# Patient Record
(demographics unavailable — no encounter records)

---

## 2025-02-24 NOTE — HISTORY OF PRESENT ILLNESS
[FreeTextEntry1] : Carlton Wolfe Sep-30-'81  41 y/o   *HTN-diagnosed in 20-30s, no family hx HTN *DANIELA-severe *Gout, partial thyroidectomy '13,'14 not on supplementation  here for followup  Last visit Dec '23 reported headaches on amlodiine 5 mg daily discussed splitting to 2.5 BID abnormal sleep study reviewed later via telehealth Jan '24 2 month followup advised  Since then no problems BPs 123/81 at urgent care checks 1-2 tiems a month 120s/80s didn't followup w/ sleep med advised him to followup. added more cardio into workouts plays ice hockey.  reviewed meds: -amlodipine 5 mg daily taking once a day & on longer w/ headaches -Valsartan-HCTZ 320-25 daily  labs reviewed - K 4.1 Cr4 1.04   Glc 101   HDL 37   ASCVD 10 yr 2.7% - emphasize lifestyle no meds - reviewd this w/ pt.  atypical chest discomfort secs not consistently w/ cardio reassured it is musculoskeletal.  sees PCP who orders carotid US yearly & Echo every 2-3 yrs last one June '24  manual -135/80s on both arms.  reports recent lung infections pna bronchitis 2x a year over last 2 yrs. ================================== TESTING:  *ECG Jun '23: NSR rsR' no ischemic changes. *Echo Jul '23" normal EF no LVH MVP w/ mild MR (will repeat eval. in 2 yrs given MVP & mild MR)  Aug '23: Justin - normal, renin pending Renal US Aug '23: no renal artery stenosis  *Dec '23: "severe DANIELA. Decreased sleep efficiency, DANIELA worse supine" "recc: followup w/ polysomnogram & CPAP avoid sleeping in supine position" ==================================

## 2025-03-13 NOTE — IMAGING
[de-identified] : (Exam: Hip)   Laterality is right    Patient is in no acute distress, alert and oriented Sensation is grossly intact to light touch in the foot Motor function is 5/5 in the foot Capillary refill is less than 2 seconds in the toes Lymphadenopathy is not present Peripheral edema is not present   Skin is intact Swelling is not present Atrophy is not present   Trochanteric tenderness is not present Thigh tenderness is present laterally Groin tenderness is not present Lumbar tenderness is not present   Flexion is 120 External rotation is 45 Internal rotation is 20 Abduction is 50   Flexion strength is 4/5 Extension strength is 4/5 Abduction strength is 4/5 Adduction strength is 4/5   MARIO test is normal FADIR test is normal   (Exam: Knee)   Laterality is right    Skin is intact Effusion is not present Atrophy is not present Antalgic gait is present   Medial joint line tenderness is not present Lateral joint line tenderness is not present Patellar tenderness is not present Calf tenderness is not present   Knee extension is 0 Knee flexion is 135   Lachman is normal Posterior drawer is normal Varus stress stability is normal Valgus stress stability is normal   Medial Armando test is normal Lateral Armando test is normal Patellofemoral grind test is normal Patellar apprehension test is normal [Right] : right knee [All Views] : anteroposterior, lateral, skyline, and anteroposterior standing [There are no fractures, subluxations or dislocations. No significant abnormalities are seen] : There are no fractures, subluxations or dislocations. No significant abnormalities are seen

## 2025-03-13 NOTE — DISCUSSION/SUMMARY
[de-identified] : (Impression) -right thigh contusion   The diagnosis was explained in detail. The potential non-surgical and surgical treatments were reviewed. The relative risks and benefits of each option were considered relative to the patient age, activity level, medical history, symptom severity and previously attempted treatments.  The patient was advised to consult with their primary medical provider prior to initiation of any new medications to reduce the risk of adverse effects specific to their long-term home medications and medical history. The risk of gastrointestinal irritation and kidney injury specific to long-term NSAID use was discussed.    (Plan)  -Physical therapy recommended for stretching and strengthening. -Over the counter NSAID for pain and inflammation, take as needed. -MRI is deferred at this time. -Resume activity as tolerated. -Follow up in 4 weeks. If symptoms persist consider MRI.     (MDM) Problem Complexity -Moderate: acute, complicated injury   Risk -Low: over the counter medication   Visit Type -Established

## 2025-03-13 NOTE — HISTORY OF PRESENT ILLNESS
[de-identified] : Date of initial evaluation: 03/13/2025 Patient age: 43 year Body part causing symptoms: right thigh  Location of the pain: lateral Pain score today: 8/10 Duration of symptoms: 03/07/2025  History of injury: got hip checked in right lateral thigh playing hockey. reports pain has been resolving. he notes bruising. he is still playing hockey. reports history of right knee meniscus surgery in 2017 without current symptoms Activities that worsen the pain: walking, prolonged sitting, standing from seated, sleep  Radicular symptoms: none  Medications attempted for this problem: Naproxen  PT for this problem: none  Injections for this problem: none  Previous surgery on this body part: knee scope x2 Prior imaging: none  Occupation: finance for [employer]

## 2025-04-10 NOTE — DATA REVIEWED
[MRI] : MRI [Right] : of the right [Lower Extremities] : lower extremities [I independently reviewed and interpreted images and report] : I independently reviewed and interpreted images and report [FreeTextEntry1] : resolving seroma vs hematoma lateral thigh, low grade strain vastus lateralis, mild PF OA

## 2025-04-10 NOTE — HISTORY OF PRESENT ILLNESS
[de-identified] : 04/10/2025: f/u right thigh, MRI results. Going to acupuncture 1x a week. Symptoms are improving, minimal pain, 2/10. Taking Ibuprofen prn.   Date of initial evaluation: 03/13/2025 Patient age: 43 year Body part causing symptoms: right thigh  Location of the pain: lateral Pain score today: 8/10 Duration of symptoms: 03/07/2025  History of injury: got hip checked in right lateral thigh playing hockey. reports pain has been resolving. he notes bruising. he is still playing hockey. 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

## 2025-04-10 NOTE — DISCUSSION/SUMMARY
[de-identified] : (Impression) -right thigh contusion, vastus lateralis strain  The diagnosis was explained in detail. The potential non-surgical and surgical treatments were reviewed. The relative risks and benefits of each option were considered relative to the patient age, activity level, medical history, symptom severity and previously attempted treatments.  The patient was advised to consult with their primary medical provider prior to initiation of any new medications to reduce the risk of adverse effects specific to their long-term home medications and medical history. The risk of gastrointestinal irritation and kidney injury specific to long-term NSAID use was discussed.    (Plan)  -Return to activity as tolerated. -Over the counter NSAID for pain and inflammation, take as needed. -Follow up as needed.      (MDM) Problem Complexity -Moderate: acute, complicated injury   Risk -Low: over the counter medication   Visit Type -Established